# Patient Record
Sex: FEMALE | Race: AMERICAN INDIAN OR ALASKA NATIVE
[De-identification: names, ages, dates, MRNs, and addresses within clinical notes are randomized per-mention and may not be internally consistent; named-entity substitution may affect disease eponyms.]

---

## 2020-12-11 ENCOUNTER — HOSPITAL ENCOUNTER (OUTPATIENT)
Dept: HOSPITAL 5 - GIO | Age: 40
Discharge: HOME | End: 2020-12-11
Attending: INTERNAL MEDICINE
Payer: COMMERCIAL

## 2020-12-11 VITALS — DIASTOLIC BLOOD PRESSURE: 83 MMHG | SYSTOLIC BLOOD PRESSURE: 118 MMHG

## 2020-12-11 DIAGNOSIS — R19.4: Primary | ICD-10-CM

## 2020-12-11 DIAGNOSIS — K63.89: ICD-10-CM

## 2020-12-11 DIAGNOSIS — K57.30: ICD-10-CM

## 2020-12-11 DIAGNOSIS — Z79.899: ICD-10-CM

## 2020-12-11 PROCEDURE — 81025 URINE PREGNANCY TEST: CPT

## 2020-12-11 PROCEDURE — 88305 TISSUE EXAM BY PATHOLOGIST: CPT

## 2020-12-11 PROCEDURE — 45380 COLONOSCOPY AND BIOPSY: CPT

## 2020-12-11 NOTE — OPERATIVE REPORT
COLONOSCOPY



INDICATIONS:  This is a 40-year-old female who has been having changes in bowel

habit.  Colonoscopy was done to make sure that there was not any associated

lower GI pathology present accounting for it.



DESCRIPTION OF PROCEDURE:  The procedure was done after getting informed consent

with MAC anesthesia.  Initial rectal exam was unremarkable.  Instrument was

passed through the rectum onto the cecum, which was identified with ileocecal

valve and appendiceal orifice.  Visualization was fair.  The terminal ileum was

intubated, showed normal mucosa.  Biopsy was done to rule out for possible

ileitis.  Cecum, ascending colon, transverse colon, descending colon, and

sigmoid was essentially normal other than for a solitary diverticula noted in

the sigmoid and the rectum appeared normal on the retroverted view.  Random

biopsies were done throughout the colon to rule out for possible microscopic

colitis with minimal bleeding.



ASSESSMENT:  Changes in bowel habit, rule out ileitis, rule out microscopic

colitis, minor diverticula.  No internal hemorrhoids noted.  There was minimal

bleeding associated with the procedure.



PLAN:  To wait for the biopsy results, have the patient avoid aspirin and

aspirin-related products for the next few days.  The patient may be given

Linzess 145 mcg to be tried for her changes in bowel habits and have the patient

follow up in the office in 1-2 weeks' time.



The procedure was done in the GI lab with assistance of the GI lab team, which

included RNSulma with the assistance of anesthesia and the GI techErendira.





DD: 12/11/2020 12:06

DT: 12/11/2020 13:17

JOB# [id number]  [id number]

OPAL/NITA

## 2020-12-11 NOTE — ANESTHESIA CONSULTATION
Anesthesia Consult and Med Hx





- Airway


Anesthetic Teeth Evaluation: Good


ROM Head & Neck: Adequate


Mental/Hyoid Distance: Adequate


Mallampati Class: Class III


Intubation Access Assessment: Possibly Difficult





- Pulmonary Exam


CTA: Yes





- Cardiac Exam


Cardiac Exam: RRR





- Pre-Operative Health Status


ASA Pre-Surgery Classification: ASA1


Proposed Anesthetic Plan: MAC





- Pulmonary


Hx Smoking: No


Hx Respiratory Symptoms: No





- Cardiovascular System


Hx Hypertension: No





- Central Nervous System


CVA: No





- Endocrine


Hx Renal Disease: No


Hx Liver Disease: No


Hx Insulin Dependent Diabetes: No


Hx Non-Insulin Dependent Diabetes: No


Hx Thyroid Disease: No





- Other Systems


Hx Obesity: No

## 2020-12-11 NOTE — PROCEDURE NOTE
Date of procedure: 12/11/20


Pre-op diagnosis: Changes in Bowel Habit


Post-op diagnosis: other (R/o Microscopic Colitis/ R/O Ileitis/ Minor,Sigmoid 

Diverticuli)


Procedure: 





Colonoscopy with Biopsy


Anesthesia: MAC


Surgeon: SUKHWINDER STAFFORD


Estimated blood loss: minimal


Pathology: list


Specimen disposition: to lab


Condition: stable


Disposition: same day (Treat with Linzess.  Avoid aspirin and NSAID for 5 days; 

otherwise resume home medication and follow up in 1 to 2 weeks ([phone]).)

## 2020-12-18 ENCOUNTER — HOSPITAL ENCOUNTER (OUTPATIENT)
Dept: HOSPITAL 5 - GIO | Age: 40
Discharge: HOME | End: 2020-12-18
Attending: INTERNAL MEDICINE
Payer: COMMERCIAL

## 2020-12-18 VITALS — DIASTOLIC BLOOD PRESSURE: 81 MMHG | SYSTOLIC BLOOD PRESSURE: 118 MMHG

## 2020-12-18 DIAGNOSIS — K21.00: Primary | ICD-10-CM

## 2020-12-18 DIAGNOSIS — Z72.89: ICD-10-CM

## 2020-12-18 DIAGNOSIS — Z79.899: ICD-10-CM

## 2020-12-18 DIAGNOSIS — K29.70: ICD-10-CM

## 2020-12-18 DIAGNOSIS — K31.89: ICD-10-CM

## 2020-12-18 PROCEDURE — 43239 EGD BIOPSY SINGLE/MULTIPLE: CPT

## 2020-12-18 PROCEDURE — 81025 URINE PREGNANCY TEST: CPT

## 2020-12-18 PROCEDURE — 88342 IMHCHEM/IMCYTCHM 1ST ANTB: CPT

## 2020-12-18 PROCEDURE — 88305 TISSUE EXAM BY PATHOLOGIST: CPT

## 2020-12-18 NOTE — OPERATIVE REPORT
PROCEDURE:  Esophagogastroduodenoscopy with biopsy. 



INDICATIONS:  This is a 40-year-old female who had a colonoscopy done a week

ago.  At that time, biopsy from the terminal ileum was positive for markers for

CD3 and CD20 suggested that the patient possibly may have some underlying

lymphoma.  She also gives a history of gluten enteropathy.  EGD was done to make

sure that there was not any associated celiac disease or any other significant

pathology involved with the duodenal lining. 



DESCRIPTION OF PROCEDURE:  The procedure was done after getting informed consent

with MAC anesthesia.  Instrument was passed through the hypopharynx into the

esophagus, which showed some mild distal erosive esophagitis. 

Photodocumentation and biopsy was done from the distal esophagus to assess for

the severity of the esophagitis and also to rule out for eosinophilic

esophagitis.  The stomach showed some antral gastritis.  No ulcers were noted in

the straight or the retroverted view.  The pylorus was patent.  The duodenum in

the first and second portion appeared normal.  Biopsy was done from the second

part to rule out for possible celiac disease.  Additional biopsy was done from

the gastric antrum, gastric body and angular incisura to rule out for H. pylori

and atrophic gastritis.  There was minimal bleeding associated with the

procedure.  No complications associated with the procedure. 



ASSESSMENT:  Gastroesophageal reflux disease symptoms, mild distal erosive

esophagitis, rule out eosinophilic esophagitis, rule out celiac disease,

gastritis, no peptic ulcer disease noted, patent pylorus. 



PLAN:  To have the patient avoid aspirin and aspirin-related products.  Await

for the biopsy results.  Further treatment will be adjusted according to the

biopsy findings.  Continue with present treatment, avoid aspirin and

aspirin-related products for the next 5 days, but otherwise resume home

medication.  Follow up in the office in 1-2 weeks' time. 



The procedure was done in the GI lab with assistance of the GI lab team

including the GI tech, the GI nurse and the assistance of anesthesia.





DD: 12/18/2020 11:55

DT: 12/18/2020 12:14

JOB# [id number]  [id number]

OPAL/NITA

## 2020-12-18 NOTE — ANESTHESIA CONSULTATION
Anesthesia Consult and Med Hx


Date of service: 12/18/20





- Airway


Anesthetic Teeth Evaluation: Good


ROM Head & Neck: Adequate


Mental/Hyoid Distance: Adequate


Mallampati Class: Class I


Intubation Access Assessment: Good





- Pulmonary Exam


CTA: Yes





- Pre-Operative Health Status


ASA Pre-Surgery Classification: ASA2


Proposed Anesthetic Plan: MAC





- Pulmonary


Hx Smoking: No


Hx Asthma: No


Hx Respiratory Symptoms: No


Hx Sleep Apnea: No





- Cardiovascular System


Hx Hypertension: No


Hx Angina: No


Hx Valvular Heart Disease: No





- Central Nervous System


Hx Neuromuscular Disorder: No


Hx Seizures: No


CVA: No


Hx Psychiatric Problems: No





- Gastrointestinal


Hx Gastroesophageal Reflux Disease: Yes





- Endocrine


Hx Renal Disease: No


Hx Liver Disease: No


Hx Insulin Dependent Diabetes: No


Hx Non-Insulin Dependent Diabetes: No


Hx Thyroid Disease: No





- Other Systems


Hx Alcohol Use: Yes


Hx Substance Use: No


Hx Cancer: No


Hx Obesity: No





- Additional Comments


Anesthesia Medical History Comments: Patient denies previous anesthesia 

complications.

## 2020-12-18 NOTE — HISTORY AND PHYSICAL REPORT
HISTORY OF PRESENT ILLNESS:  This is a 40-year-old female who had a colonoscopy

done about a week ago because of changes in her bowel habits.  At that time, she

was noted to have a normal looking mucosa.  Biopsies at that time were done

randomly from the colon as well as from the terminal ileum to rule out for

microscopic colitis and for ileitis.  The biopsy that was done from the ileum

showed evidence of CD3 as well as CD20 cells.  The patient also gives a history

of gluten enteropathy and has been advised to have EGD done to make sure that

she does not have any associated celiac disease or any changes in markers for

abnormal lymphocytes like the CD3 and CD20 that was noted in the ileum.  She is

otherwise doing well.



ALLERGIES:  No known allergies.



SOCIAL HISTORY:  Denies history of smoking or alcohol use.  No cardiac issues. 

No flu shots.



PHYSICAL EXAMINATION:

VITAL SIGNS:  Temperature is 97.5, blood pressure is 123/83, pulse is 72, height

is 5 feet 4 inches and weighs 170 pounds.

HEENT:  Shows no JVD.

LUNGS:  Clear to auscultation.

CARDIOVASCULAR:  Normal.

ABDOMEN:  Showed some lower abdominal tenderness.  Bowel sounds present No pedal

edema.

NEUROLOGIC:  The patient is otherwise alert and oriented.



ASSESSMENT:  Possible celiac disease, possible history of lymphoma since she had

CD3 and CD20 cells in the ileum and minor diverticula.



PLAN:  To do an EGD for further assessment to check labs, namely CBC, follow up

after the EGD and to do additional radiologic studies if warranted and also

possibly to refer the patient to a hematologist.





DD: 12/18/2020 11:28

DT: 12/18/2020 12:00

JOB# [id number]  [id number]

OPAL/NITA

## 2020-12-18 NOTE — PROCEDURE NOTE
Date of procedure: 12/18/20


Pre-op diagnosis: GERD/ R/O Celiac Disease


Post-op diagnosis: other (R/O Celiac disease/ R/o Eosinophilic Esophagitis/ 

Mild,distal Esophagitis/ Gastritis/ No Peptic Ulcer Disease noted)


Procedure: 





EGD with Biopsy


Anesthesia: MAC


Surgeon: SUKHWINDER STAFFORD


Estimated blood loss: minimal


Pathology: none


Specimen disposition: to lab


Condition: stable


Disposition: same day (Avoid aspirin and NSAID for 5 days; otherwise resume home

medication and follow up in 1 to 2 weeks ([phone]).)

## 2021-01-07 ENCOUNTER — HOSPITAL ENCOUNTER (OUTPATIENT)
Dept: HOSPITAL 5 - PET | Age: 41
Discharge: HOME | End: 2021-01-07
Attending: INTERNAL MEDICINE
Payer: COMMERCIAL

## 2021-01-07 DIAGNOSIS — N85.9: ICD-10-CM

## 2021-01-07 DIAGNOSIS — C85.90: Primary | ICD-10-CM

## 2021-01-07 PROCEDURE — 78815 PET IMAGE W/CT SKULL-THIGH: CPT

## 2021-01-07 PROCEDURE — 82962 GLUCOSE BLOOD TEST: CPT

## 2021-01-07 PROCEDURE — A9552 F18 FDG: HCPCS

## 2021-01-07 NOTE — PET REPORT
PET/CT



HISTORY: C85.90.  Initial staging of lymphoma



TECHNIQUE:  The patient's fasting blood glucose was 76.  The patient weighed 171 lbs.  The patient wa
s injected with 15.4 mCi of FDG in the left antecubital fossa at 1307 hours and imaging was started a
t 1423 hours.  The patient was imaged from the skull base to the thighs. [facility] CT [facility] are performed using CT dose reduction for ALARA by means of automated exposure control. Images were
 reviewed on a workstation.



COMPARISON:  None



FINDINGS:



IMAGED BRAIN: [Physiologic FDG uptake.

NECK: Physiologic FDG uptake.

CHEST WALL:  Physiologic FDG uptake.

MEDIASTINUM:  Physiologic FDG uptake.

LUNGS:  Physiologic FDG uptake.

HEPATOBILIARY:  Physiologic FDG uptake.

PANCREAS:  Physiologic FDG uptake.

SPLEEN:  Physiologic FDG uptake.

KIDNEYS/BLADDER:  Physiologic FDG uptake.

ADRENAL GLANDS:  Physiologic FDG uptake.

GI/MESENTERY:  Physiologic FDG uptake.

PELVIC VISCERA:  Physiologic FDG uptake.

LYMPH NODES:  Physiologic FDG uptake. No adenopathy is detected.

OSSEOUS STRUCTURES:  Physiologic FDG uptake.



ADDITIONAL FINDINGS:  A 4.3 x 3.1 cm lesion in the left adnexa contains fat and soft tissue density c
onsistent with a dermoid..





IMPRESSION:

 Negative PET CT.

Incidental left adnexal dermoid.



Signer Name: Geoff Burris Jr, MD 

Signed: 1/7/2021 3:33 PM

Workstation Name: [id number]